# Patient Record
Sex: FEMALE | Race: BLACK OR AFRICAN AMERICAN
[De-identification: names, ages, dates, MRNs, and addresses within clinical notes are randomized per-mention and may not be internally consistent; named-entity substitution may affect disease eponyms.]

---

## 2018-04-29 ENCOUNTER — HOSPITAL ENCOUNTER (EMERGENCY)
Dept: HOSPITAL 17 - NEPE | Age: 43
Discharge: HOME | End: 2018-04-29
Payer: SELF-PAY

## 2018-04-29 VITALS — TEMPERATURE: 99.4 F | DIASTOLIC BLOOD PRESSURE: 85 MMHG | SYSTOLIC BLOOD PRESSURE: 132 MMHG

## 2018-04-29 VITALS
HEART RATE: 96 BPM | RESPIRATION RATE: 20 BRPM | TEMPERATURE: 100.7 F | DIASTOLIC BLOOD PRESSURE: 77 MMHG | OXYGEN SATURATION: 99 % | SYSTOLIC BLOOD PRESSURE: 132 MMHG

## 2018-04-29 VITALS — RESPIRATION RATE: 20 BRPM

## 2018-04-29 VITALS
SYSTOLIC BLOOD PRESSURE: 132 MMHG | TEMPERATURE: 100.7 F | RESPIRATION RATE: 20 BRPM | OXYGEN SATURATION: 99 % | HEART RATE: 96 BPM | DIASTOLIC BLOOD PRESSURE: 77 MMHG

## 2018-04-29 VITALS — HEIGHT: 67 IN | WEIGHT: 220.46 LBS | BODY MASS INDEX: 34.6 KG/M2

## 2018-04-29 DIAGNOSIS — J09.X2: Primary | ICD-10-CM

## 2018-04-29 DIAGNOSIS — J45.909: ICD-10-CM

## 2018-04-29 DIAGNOSIS — R11.2: ICD-10-CM

## 2018-04-29 DIAGNOSIS — I10: ICD-10-CM

## 2018-04-29 DIAGNOSIS — R19.7: ICD-10-CM

## 2018-04-29 DIAGNOSIS — Z79.899: ICD-10-CM

## 2018-04-29 LAB
ALBUMIN SERPL-MCNC: 3.7 GM/DL (ref 3.4–5)
ALP SERPL-CCNC: 78 U/L (ref 45–117)
ALT SERPL-CCNC: 14 U/L (ref 10–53)
AST SERPL-CCNC: 22 U/L (ref 15–37)
BASOPHILS # BLD AUTO: 0.1 TH/MM3 (ref 0–0.2)
BASOPHILS NFR BLD: 1.1 % (ref 0–2)
BILIRUB SERPL-MCNC: 0.3 MG/DL (ref 0.2–1)
BUN SERPL-MCNC: 6 MG/DL (ref 7–18)
CALCIUM SERPL-MCNC: 8.9 MG/DL (ref 8.5–10.1)
CHLORIDE SERPL-SCNC: 107 MEQ/L (ref 98–107)
CREAT SERPL-MCNC: 0.78 MG/DL (ref 0.5–1)
EOSINOPHIL # BLD: 0 TH/MM3 (ref 0–0.4)
EOSINOPHIL NFR BLD: 0.2 % (ref 0–4)
ERYTHROCYTE [DISTWIDTH] IN BLOOD BY AUTOMATED COUNT: 16.7 % (ref 11.6–17.2)
GFR SERPLBLD BASED ON 1.73 SQ M-ARVRAT: 81 ML/MIN (ref 89–?)
GLUCOSE SERPL-MCNC: 74 MG/DL (ref 74–106)
HCO3 BLD-SCNC: 23.4 MEQ/L (ref 21–32)
HCT VFR BLD CALC: 31.4 % (ref 35–46)
HGB BLD-MCNC: 10.3 GM/DL (ref 11.6–15.3)
LYMPHOCYTES # BLD AUTO: 0.3 TH/MM3 (ref 1–4.8)
LYMPHOCYTES NFR BLD AUTO: 3.5 % (ref 9–44)
MAGNESIUM SERPL-MCNC: 1.7 MG/DL (ref 1.5–2.5)
MCH RBC QN AUTO: 26.3 PG (ref 27–34)
MCHC RBC AUTO-ENTMCNC: 32.7 % (ref 32–36)
MCV RBC AUTO: 80.5 FL (ref 80–100)
MONOCYTE #: 1 TH/MM3 (ref 0–0.9)
MONOCYTES NFR BLD: 14 % (ref 0–8)
NEUTROPHILS # BLD AUTO: 6 TH/MM3 (ref 1.8–7.7)
NEUTROPHILS NFR BLD AUTO: 81.2 % (ref 16–70)
PLATELET # BLD: 315 TH/MM3 (ref 150–450)
PMV BLD AUTO: 7.3 FL (ref 7–11)
PROT SERPL-MCNC: 7.5 GM/DL (ref 6.4–8.2)
RBC # BLD AUTO: 3.9 MIL/MM3 (ref 4–5.3)
SODIUM SERPL-SCNC: 139 MEQ/L (ref 136–145)
WBC # BLD AUTO: 7.4 TH/MM3 (ref 4–11)

## 2018-04-29 PROCEDURE — 85025 COMPLETE CBC W/AUTO DIFF WBC: CPT

## 2018-04-29 PROCEDURE — 96361 HYDRATE IV INFUSION ADD-ON: CPT

## 2018-04-29 PROCEDURE — 87804 INFLUENZA ASSAY W/OPTIC: CPT

## 2018-04-29 PROCEDURE — 83690 ASSAY OF LIPASE: CPT

## 2018-04-29 PROCEDURE — 96375 TX/PRO/DX INJ NEW DRUG ADDON: CPT

## 2018-04-29 PROCEDURE — 87040 BLOOD CULTURE FOR BACTERIA: CPT

## 2018-04-29 PROCEDURE — 71045 X-RAY EXAM CHEST 1 VIEW: CPT

## 2018-04-29 PROCEDURE — 83735 ASSAY OF MAGNESIUM: CPT

## 2018-04-29 PROCEDURE — 80053 COMPREHEN METABOLIC PANEL: CPT

## 2018-04-29 PROCEDURE — 94664 DEMO&/EVAL PT USE INHALER: CPT

## 2018-04-29 PROCEDURE — 96374 THER/PROPH/DIAG INJ IV PUSH: CPT

## 2018-04-29 PROCEDURE — 99284 EMERGENCY DEPT VISIT MOD MDM: CPT

## 2018-04-29 NOTE — PD
HPI


Chief Complaint:  Cold / Flu Symptoms


Time Seen by Provider:  16:56


Travel History


International Travel<30 days:  No


Contact w/Intl Traveler<30days:  No


Traveled to known affect area:  No





History of Present Illness


HPI


42-year-old female that presents to the ED for evaluation of cold-like 

symptoms.  Patient has a history of having cold-like symptoms since last night.

  Per patient she has been having cough and runny nose.  She has been having 

also nausea and vomiting and sore throat.  She states that 1 of her 

grandchildren was recently diagnosed with influenza.  She thinks that she has 

the same.  She has been having some diarrhea.  Denies any urinary issues.  She 

denies having any abdominal pain.  No chest pain or shortness of breath.  She 

does have a history of asthma and states that she has been wheezing inside some 

shortness of breath but not currently.  She does have bad coughing spells.  

States having fevers chills and sweats.  She has been taking over-the-counter 

remedies with no relief.  She has no allergies to medication.  She has not seen 

anybody for this.  No other medical issues at this time.  She does have a 

history of high blood pressure and takes medications for this.  She states that 

she ran out of her nebulizer treatments.





PFSH


Past Medical History


Cardiovascular Problems:  Yes (HTN)


Diminished Hearing:  No


Hypertension:  Yes


Respiratory:  Yes (asthma)


Tetanus Vaccination:  < 5 Years


Influenza Vaccination:  No


Pregnant?:  Not Pregnant


LMP:  2018


:  4


Para:  4


Miscarriage:  0


:  0





Past Surgical History


 Section:  Yes (x4)





Social History


Alcohol Use:  Yes (social)


Tobacco Use:  No


Substance Use:  No





Allergies-Medications


(Allergen,Severity, Reaction):  


Coded Allergies:  


     No Known Allergies (Unverified , 18)


Reported Meds & Prescriptions





Reported Meds & Active Scripts


Active


Diclofenac Sodium DR (Diclofenac Sodium) 75 Mg Tabdr 75 Mg PO BID PRN


Zofran Odt (Ondansetron Odt) 4 Mg Tab 4 Mg SL Q6HR PRN


Tamiflu (Oseltamivir Phosphate) 75 Mg Cap 75 Mg PO BID 5 Days


Reported


Norvasc (Amlodipine Besylate) 2.5 Mg Tab 2.5 Mg PO DAILY








Review of Systems


Except as stated in HPI:  all other systems reviewed are Neg





Physical Exam


Narrative


GENERAL: 


SKIN: Warm and dry.


HEAD: Atraumatic. Normocephalic. 


EYES: Pupils equal and round. No scleral icterus. No injection or drainage. 


ENT: No nasal bleeding or discharge.  Mucous membranes pink and moist.  Tongue 

is midline.  No uvula deviation.


NECK: Trachea midline. No JVD. 


CARDIOVASCULAR: Regular rate and rhythm.  No murmurs, S3, S4


RESPIRATORY: No accessory muscle use.  Mild wheezing heard. Breath sounds equal 

bilaterally. 


GASTROINTESTINAL: Abdomen soft, non-tender, nondistended. Hepatic and splenic 

margins not palpable. 


MUSCULOSKELETAL: Extremities without clubbing, cyanosis, or edema. No obvious 

deformities.  Full range of motion of the upper and lower extremities 

bilaterally.  2+ pulses bilaterally.


NEUROLOGICAL: Awake and alert. No obvious cranial nerve deficits.  Motor 

grossly within normal limits. Five out of 5 muscle strength in the arms and 

legs.  Normal speech.


PSYCHIATRIC: Appropriate mood and affect; insight and judgment normal.





Data


Data


Last Documented VS





Vital Signs








  Date Time  Temp Pulse Resp B/P (MAP) Pulse Ox O2 Delivery O2 Flow Rate FiO2


 


18 18:17   20     


 


18 17:36 100.7 96  132/77 (95) 99 Room Air  








Orders





 Orders


Complete Blood Count With Diff (18 17:02)


Comprehensive Metabolic Panel (18 17:02)


Blood Culture (18 17:02)


Lipase (18 17:02)


Magnesium (Mg) (18 17:02)


Influenzae A/B Antigen (18 17:02)


Chest, Single Ap (18 17:02)


Iv Access Insert/Monitor (18 17:02)


Ecg Monitoring (18 17:02)


Oximetry (18 17:02)


Ondansetron Inj (Zofran Inj) (18 17:15)


Sodium Chlor 0.9% 1000 Ml Inj (Ns 1000 M (18 17:15)


Ketorolac Inj (Toradol Inj) (18 17:15)


Methylprednisolone So Succ Inj (Solumedr (18 17:15)


Albuterol-Ipratropium Neb (Duoneb Neb) (18 17:15)


Oseltamivir (Tamiflu) (18 18:00)


Ed Discharge Order (18 18:27)





Labs





Laboratory Tests








Test


  18


17:20


 


White Blood Count 7.4 TH/MM3 


 


Red Blood Count 3.90 MIL/MM3 


 


Hemoglobin 10.3 GM/DL 


 


Hematocrit 31.4 % 


 


Mean Corpuscular Volume 80.5 FL 


 


Mean Corpuscular Hemoglobin 26.3 PG 


 


Mean Corpuscular Hemoglobin


Concent 32.7 % 


 


 


Red Cell Distribution Width 16.7 % 


 


Platelet Count 315 TH/MM3 


 


Mean Platelet Volume 7.3 FL 


 


Neutrophils (%) (Auto) 81.2 % 


 


Lymphocytes (%) (Auto) 3.5 % 


 


Monocytes (%) (Auto) 14.0 % 


 


Eosinophils (%) (Auto) 0.2 % 


 


Basophils (%) (Auto) 1.1 % 


 


Neutrophils # (Auto) 6.0 TH/MM3 


 


Lymphocytes # (Auto) 0.3 TH/MM3 


 


Monocytes # (Auto) 1.0 TH/MM3 


 


Eosinophils # (Auto) 0.0 TH/MM3 


 


Basophils # (Auto) 0.1 TH/MM3 


 


CBC Comment DIFF FINAL 


 


Differential Comment  


 


Blood Urea Nitrogen 6 MG/DL 


 


Creatinine 0.78 MG/DL 


 


Random Glucose 74 MG/DL 


 


Total Protein 7.5 GM/DL 


 


Albumin 3.7 GM/DL 


 


Calcium Level 8.9 MG/DL 


 


Magnesium Level 1.7 MG/DL 


 


Alkaline Phosphatase 78 U/L 


 


Aspartate Amino Transf


(AST/SGOT) 22 U/L 


 


 


Alanine Aminotransferase


(ALT/SGPT) 14 U/L 


 


 


Total Bilirubin 0.3 MG/DL 


 


Sodium Level 139 MEQ/L 


 


Potassium Level 3.5 MEQ/L 


 


Chloride Level 107 MEQ/L 


 


Carbon Dioxide Level 23.4 MEQ/L 


 


Anion Gap 9 MEQ/L 


 


Estimat Glomerular Filtration


Rate 81 ML/MIN 


 


 


Lipase 151 U/L 











MDM


Medical Decision Making


Medical Screen Exam Complete:  Yes


Emergency Medical Condition:  Yes


Medical Record Reviewed:  Yes


Interpretation(s)


CBC & BMP Diagram


18 17:20








Total Protein 7.5, Albumin 3.7, Calcium Level 8.9, Magnesium Level 1.7, 

Alkaline Phosphatase 78, Aspartate Amino Transf (AST/SGOT) 22, Alanine 

Aminotransferase (ALT/SGPT) 14, Total Bilirubin 0.3





influenza positive for B





Last Impressions








Chest X-Ray 18 1702 Signed





Impressions: 





 Service Date/Time:  2018 17:16 - CONCLUSION:  1. No acute 





 cardiopulmonary disease.     Froilan Anglin MD 








Differential Diagnosis


Shortness of breath versus pneumonia versus influenza versus bronchitis versus 

pharyngitis


Narrative Course


42-year-old female that presents to the ED for evaluation of cold-like 

symptoms.  Patient was properly examined and was found to have signs and 

symptoms consistent with appears to be likely viral illness.  Labs and imaging 

order.  Patient was given breathing treatments here.  Patient was given 

medications for fever.  Labs and imaging showed positive for flu.  Otherwise 

unremarkable.  Patient was reassured.  At this time this appears to be 

influenza.  Patient was given first dose of Tamiflu here.  Patient feels much 

improved after medications given.  Patient given prescription for Tamiflu, 

Zofran and diclofenac sodium.  Told that symptoms will likely last about a week 

or more.  Close follow-up with PCP.  Given note for work.  See ED if worsening 

symptoms.





Diagnosis





 Primary Impression:  


 Influenza B


Patient Instructions:  General Instructions


Departure Forms:  Tests/Procedures, Work Release   Enter return to work date:  

May 3, 2018





***Additional Instructions:  


Motrin and Tylenol for pain and fever.


You can use over-the-counter antihistamine as well as well as Mucinex as needed 

for runny nose and congestion.


Cough drops for cough as needed.


Drink plenty of fluids.


Follow-up with PCP.


See ED for worsening symptoms.


***Med/Other Pt SpecificInfo:  Prescription(s) given


Scripts


Diclofenac Sodium  (Diclofenac Sodium DR) 75 Mg Tabdr


75 MG PO BID Y for PAIN SCALE 1 TO 10, #20 TAB 0 Refills


   Prov: Stacie Rosa MD         18 


Ondansetron Odt (Zofran Odt) 4 Mg Tab


4 MG SL Q6HR Y for Nausea/Vomiting, #20 TAB 0 Refills


   Prov: Stacie Rosa MD         18 


Oseltamivir (Tamiflu) 75 Mg Cap


75 MG PO BID for Mgmt Viral Infection for 5 Days, #10 CAP 0 Refills


   Prov: Stacie Rosa MD         18


Disposition:  01 DISCHARGE HOME


Condition:  Stable











Kevin Perez 2018 17:48

## 2018-04-29 NOTE — RADRPT
EXAM DATE/TIME:  04/29/2018 17:16 

 

[facility] COMPARISON:     

No previous studies available for comparison.

 

                     

INDICATIONS :     

Short of breath.

                     

 

MEDICAL HISTORY :     

None.          

 

SURGICAL HISTORY :     

None.   

 

ENCOUNTER:     

Initial                                        

 

ACUITY:     

1 day      

 

PAIN SCORE:     

0/10

 

LOCATION:     

Bilateral  chest

 

FINDINGS:     

A single view of the chest demonstrates the lungs to be symmetrically aerated without evidence of mas
s, infiltrate or effusion.  The cardiomediastinal contours are unremarkable.  Osseous structures are 
intact.

 

CONCLUSION:     

1. No acute cardiopulmonary disease.

 

 

 

 Froilan Anglin MD on April 29, 2018 at 17:47           

Board Certified Radiologist.

 This report was verified electronically.